# Patient Record
Sex: MALE | Race: OTHER | Employment: FULL TIME | URBAN - METROPOLITAN AREA
[De-identification: names, ages, dates, MRNs, and addresses within clinical notes are randomized per-mention and may not be internally consistent; named-entity substitution may affect disease eponyms.]

---

## 2022-07-28 ENCOUNTER — HOSPITAL ENCOUNTER (EMERGENCY)
Age: 37
Discharge: HOME | End: 2022-07-28

## 2022-07-28 VITALS
RESPIRATION RATE: 17 BRPM | OXYGEN SATURATION: 98 % | DIASTOLIC BLOOD PRESSURE: 67 MMHG | WEIGHT: 160 LBS | HEART RATE: 63 BPM | SYSTOLIC BLOOD PRESSURE: 108 MMHG

## 2022-07-28 DIAGNOSIS — Z13.39 SCREENING FOR ALCOHOLISM: Primary | ICD-10-CM

## 2022-07-28 LAB — ETHANOL: NORMAL MG/DL (ref 0–0.08)

## 2022-07-28 PROCEDURE — 99283 EMERGENCY DEPT VISIT LOW MDM: CPT

## 2022-07-28 PROCEDURE — 82077 ASSAY SPEC XCP UR&BREATH IA: CPT

## 2022-07-28 ASSESSMENT — ENCOUNTER SYMPTOMS
SHORTNESS OF BREATH: 0
COUGH: 0
VOMITING: 0
SORE THROAT: 0
BACK PAIN: 0
RHINORRHEA: 0
CONSTIPATION: 0
DIARRHEA: 0
NAUSEA: 0
ABDOMINAL PAIN: 0
EYE PAIN: 0

## 2022-07-28 NOTE — Clinical Note
Anastasia Cobian was seen and treated in our emergency department on 7/28/2022. He may return to work on 07/29/2022. If you have any questions or concerns, please don't hesitate to call.       NATHAN Rivera

## 2022-07-28 NOTE — ED PROVIDER NOTES
[facility]        Pt Name: Ulises Gallegos  MRN: [id number]  [place] 1985  Date of evaluation: 7/28/2022  Provider: NATHAN Francois  PCP: No primary care provider on file. Note Started: 5:01 PM EDT       MARIA ISABEL. I have evaluated this patient. My supervising physician was available for consultation. CHIEF COMPLAINT       Chief Complaint   Patient presents with    Other      used during triage. Pt        HISTORY OF PRESENT ILLNESS   (Location, Timing/Onset, Context/Setting, Quality, Duration, Modifying Factors, Severity, Associated Signs and Symptoms)  Note limiting factors. Chief Complaint: Alcohol level check    Ulises Gallegos is a 39 y.o. male who presents to the emergency department due to needing alcohol level checked. Patient states that he was pulled over by the police and taken to the detention and was ordered by the  to get a test to evaluate his alcohol level. Patient states that he had nowhere else to go and decided to come to the emergency department. Patient states that he only drinks about maybe 5 or 6 beers a week but not every week. Patient denies any headaches, dizziness, vision changes or any alcohol withdrawal type symptoms. Patient states that he does have the paperwork from the  showing that he needs to get an alcohol evaluation. Patient does not speak [place] and  was used during the entire time in the history taking. Patient denies any symptoms today    Nursing Notes were all reviewed and agreed with or any disagreements were addressed in the HPI. REVIEW OF SYSTEMS    (2-9 systems for level 4, 10 or more for level 5)     Review of Systems   Constitutional:  Negative for chills, diaphoresis and fever. HENT:  Negative for congestion, rhinorrhea and sore throat. Eyes:  Negative for pain and visual disturbance.    Respiratory: Negative for cough and shortness of breath. Cardiovascular:  Negative for chest pain and leg swelling. Gastrointestinal:  Negative for abdominal pain, constipation, diarrhea, nausea and vomiting. Genitourinary:  Negative for difficulty urinating, dysuria and frequency. Musculoskeletal:  Negative for back pain and neck pain. Skin:  Negative for rash and wound. Neurological:  Negative for dizziness and light-headedness. Positives and Pertinent negatives as per HPI. Except as noted above in the ROS, all other systems were reviewed and negative. PAST MEDICAL HISTORY   No past medical history on file. SURGICAL HISTORY   No past surgical history on file. CURRENTMEDICATIONS       Previous Medications    No medications on file         ALLERGIES     Patient has no known allergies. FAMILYHISTORY     No family history on file. SOCIAL HISTORY          SCREENINGS    [place] Coma Scale  Eye Opening: Spontaneous  Best Verbal Response: Oriented  Best Motor Response: Obeys commands  [place]e Coma Scale Score: 15        PHYSICAL EXAM    (up to 7 for level 4, 8 or more for level 5)     ED Triage Vitals [07/28/22 1656]   BP Temp Temp src Heart Rate Resp SpO2 Height Weight   108/67 -- -- 63 17 98 % -- 160 lb (72.6 kg)       Physical Exam  Vitals and nursing note reviewed. Constitutional:       General: He is not in acute distress. Appearance: He is normal weight. HENT:      Mouth/Throat:      Mouth: Mucous membranes are moist.      Pharynx: Oropharynx is clear. No oropharyngeal exudate. Cardiovascular:      Rate and Rhythm: Normal rate and regular rhythm. Pulses: Normal pulses. Heart sounds: No murmur heard. No friction rub. No gallop. Pulmonary:      Effort: Pulmonary effort is normal.      Breath sounds: Normal breath sounds. Abdominal:      General: Bowel sounds are normal.      Tenderness: There is no right CVA tenderness or left CVA tenderness.    Musculoskeletal: Cervical back: Normal range of motion and neck supple. Right lower leg: No edema. Left lower leg: No edema. Neurological:      Mental Status: He is alert and oriented to person, place, and time. Psychiatric:         Mood and Affect: Mood normal.         Behavior: Behavior normal.       DIAGNOSTIC RESULTS   LABS:    Labs Reviewed   ETHANOL       When ordered only abnormal lab results are displayed. All other labs were within normal range or not returned as of this dictation. EKG: When ordered, EKG's are interpreted by the Emergency Department Physician in the absence of a cardiologist.  Please see their note for interpretation of EKG. RADIOLOGY:   Non-plain film images such as CT, Ultrasound and MRI are read by the radiologist. Plain radiographic images are visualized and preliminarily interpreted by the ED Provider with the below findings:        Interpretation per the Radiologist below, if available at the time of this note:    No orders to display     No results found. PROCEDURES   Unless otherwise noted below, none     Procedures    CRITICAL CARE TIME       CONSULTS:  None      EMERGENCY DEPARTMENT COURSE and DIFFERENTIAL DIAGNOSIS/MDM:   Vitals:    Vitals:    07/28/22 1656   BP: 108/67   Pulse: 63   Resp: 17   SpO2: 98%   Weight: 160 lb (72.6 kg)       Patient was given the following medications:  Medications - No data to display      Is this patient to be included in the SEP-1 Core Measure due to severe sepsis or septic shock? No   Exclusion criteria - the patient is NOT to be included for SEP-1 Core Measure due to: Infection is not suspected    39-year-old male presents emergency department needing an alcohol level test due to being pulled over for alcohol intoxication. Patient states that the  and his child told him that he needed to get a alcohol test and came to the emergency department. Patient does not speak [place] and  was used.   Patient has no other symptoms today. Patient states that he only drinks about 5-6 alcoholic drinks a week but not every week. Patient denies any headaches, chest pain, nausea, vomiting, abdominal pain. Patient's alcohol level was not detected here in the emergency department. Patient discharged at this time to follow-up with primary care doctor in 1 week for recheck. FINAL IMPRESSION      1.  Screening for alcoholism          DISPOSITION/PLAN   DISPOSITION Decision To Discharge 07/28/2022 05:42:55 PM      PATIENT REFERRED TO:  Wami  [phone]  Schedule an appointment as soon as possible for a visit in 1 week  recheck    [facility] Emergency Department  [address]  [phone]    As needed    DISCHARGE MEDICATIONS:  New Prescriptions    No medications on file       DISCONTINUED MEDICATIONS:  Discontinued Medications    No medications on file              (Please note that portions of this note were completed with a voice recognition program.  Efforts were made to edit the dictations but occasionally words are mis-transcribed.)    NATHAN Mao (electronically signed)           NATHAN Mao  07/28/22 [phone]

## 2022-07-28 NOTE — Clinical Note
Javi Sandoval was seen and treated in our emergency department on 7/28/2022. He may return to work on 07/29/2022. If you have any questions or concerns, please don't hesitate to call.       NATHAN Garcia

## 2022-07-28 NOTE — Clinical Note
Jacinto Shin was seen and treated in our emergency department on 7/28/2022. He may return to work on 07/29/2022. If you have any questions or concerns, please don't hesitate to call.       NATHAN Mccann